# Patient Record
Sex: FEMALE | Race: WHITE | NOT HISPANIC OR LATINO | Employment: FULL TIME | URBAN - METROPOLITAN AREA
[De-identification: names, ages, dates, MRNs, and addresses within clinical notes are randomized per-mention and may not be internally consistent; named-entity substitution may affect disease eponyms.]

---

## 2021-08-30 ENCOUNTER — APPOINTMENT (OUTPATIENT)
Dept: CT IMAGING | Facility: CLINIC | Age: 56
End: 2021-08-30
Attending: EMERGENCY MEDICINE
Payer: COMMERCIAL

## 2021-08-30 ENCOUNTER — HOSPITAL ENCOUNTER (EMERGENCY)
Facility: CLINIC | Age: 56
Discharge: HOME | End: 2021-08-30
Attending: EMERGENCY MEDICINE | Admitting: EMERGENCY MEDICINE
Payer: COMMERCIAL

## 2021-08-30 VITALS
SYSTOLIC BLOOD PRESSURE: 122 MMHG | DIASTOLIC BLOOD PRESSURE: 80 MMHG | TEMPERATURE: 98.6 F | OXYGEN SATURATION: 95 % | RESPIRATION RATE: 10 BRPM | HEART RATE: 81 BPM

## 2021-08-30 DIAGNOSIS — J12.82 PNEUMONIA DUE TO 2019 NOVEL CORONAVIRUS: ICD-10-CM

## 2021-08-30 DIAGNOSIS — U07.1 PNEUMONIA DUE TO 2019 NOVEL CORONAVIRUS: ICD-10-CM

## 2021-08-30 LAB
ALBUMIN SERPL-MCNC: 4.1 G/DL (ref 3.4–5)
ALP SERPL-CCNC: 89 U/L (ref 40–150)
ALT SERPL W P-5'-P-CCNC: 50 U/L (ref 0–50)
ANION GAP SERPL CALCULATED.3IONS-SCNC: 7 MMOL/L (ref 3–14)
AST SERPL W P-5'-P-CCNC: 52 U/L (ref 0–45)
BASOPHILS # BLD AUTO: 0 10E3/UL (ref 0–0.2)
BASOPHILS NFR BLD AUTO: 1 %
BILIRUB SERPL-MCNC: 0.4 MG/DL (ref 0.2–1.3)
BUN SERPL-MCNC: 17 MG/DL (ref 7–30)
CALCIUM SERPL-MCNC: 8.8 MG/DL (ref 8.5–10.1)
CHLORIDE BLD-SCNC: 109 MMOL/L (ref 94–109)
CO2 SERPL-SCNC: 22 MMOL/L (ref 20–32)
CREAT SERPL-MCNC: 1.03 MG/DL (ref 0.52–1.04)
D DIMER PPP FEU-MCNC: 0.59 UG/ML FEU (ref 0–0.5)
EOSINOPHIL # BLD AUTO: 0 10E3/UL (ref 0–0.7)
EOSINOPHIL NFR BLD AUTO: 0 %
ERYTHROCYTE [DISTWIDTH] IN BLOOD BY AUTOMATED COUNT: 11.8 % (ref 10–15)
ETHANOL SERPL-MCNC: <0.01 G/DL
GFR SERPL CREATININE-BSD FRML MDRD: 62 ML/MIN/1.73M2
GLUCOSE BLD-MCNC: 93 MG/DL (ref 70–99)
HCT VFR BLD AUTO: 44.6 % (ref 35–47)
HGB BLD-MCNC: 15.2 G/DL (ref 11.7–15.7)
IMM GRANULOCYTES # BLD: 0 10E3/UL
IMM GRANULOCYTES NFR BLD: 0 %
LACTATE SERPL-SCNC: 1.5 MMOL/L (ref 0.7–2)
LYMPHOCYTES # BLD AUTO: 0.9 10E3/UL (ref 0.8–5.3)
LYMPHOCYTES NFR BLD AUTO: 23 %
MCH RBC QN AUTO: 29.9 PG (ref 26.5–33)
MCHC RBC AUTO-ENTMCNC: 34.1 G/DL (ref 31.5–36.5)
MCV RBC AUTO: 88 FL (ref 78–100)
MONOCYTES # BLD AUTO: 0.3 10E3/UL (ref 0–1.3)
MONOCYTES NFR BLD AUTO: 8 %
NEUTROPHILS # BLD AUTO: 2.5 10E3/UL (ref 1.6–8.3)
NEUTROPHILS NFR BLD AUTO: 68 %
NRBC # BLD AUTO: 0 10E3/UL
NRBC BLD AUTO-RTO: 0 /100
PLATELET # BLD AUTO: 173 10E3/UL (ref 150–450)
POTASSIUM BLD-SCNC: 3.5 MMOL/L (ref 3.4–5.3)
PROT SERPL-MCNC: 8.1 G/DL (ref 6.8–8.8)
RBC # BLD AUTO: 5.09 10E6/UL (ref 3.8–5.2)
SARS-COV-2 RNA RESP QL NAA+PROBE: POSITIVE
SODIUM SERPL-SCNC: 138 MMOL/L (ref 133–144)
TROPONIN I SERPL-MCNC: <0.015 UG/L (ref 0–0.04)
WBC # BLD AUTO: 3.7 10E3/UL (ref 4–11)

## 2021-08-30 PROCEDURE — 80053 COMPREHEN METABOLIC PANEL: CPT | Performed by: EMERGENCY MEDICINE

## 2021-08-30 PROCEDURE — 96375 TX/PRO/DX INJ NEW DRUG ADDON: CPT

## 2021-08-30 PROCEDURE — 36415 COLL VENOUS BLD VENIPUNCTURE: CPT | Performed by: EMERGENCY MEDICINE

## 2021-08-30 PROCEDURE — 85025 COMPLETE CBC W/AUTO DIFF WBC: CPT | Performed by: EMERGENCY MEDICINE

## 2021-08-30 PROCEDURE — 99285 EMERGENCY DEPT VISIT HI MDM: CPT | Mod: 25

## 2021-08-30 PROCEDURE — 250N000011 HC RX IP 250 OP 636: Performed by: EMERGENCY MEDICINE

## 2021-08-30 PROCEDURE — 93005 ELECTROCARDIOGRAM TRACING: CPT

## 2021-08-30 PROCEDURE — 250N000009 HC RX 250: Performed by: EMERGENCY MEDICINE

## 2021-08-30 PROCEDURE — 71275 CT ANGIOGRAPHY CHEST: CPT

## 2021-08-30 PROCEDURE — 83605 ASSAY OF LACTIC ACID: CPT | Performed by: EMERGENCY MEDICINE

## 2021-08-30 PROCEDURE — 85379 FIBRIN DEGRADATION QUANT: CPT | Performed by: EMERGENCY MEDICINE

## 2021-08-30 PROCEDURE — 82077 ASSAY SPEC XCP UR&BREATH IA: CPT | Performed by: EMERGENCY MEDICINE

## 2021-08-30 PROCEDURE — 87635 SARS-COV-2 COVID-19 AMP PRB: CPT | Performed by: EMERGENCY MEDICINE

## 2021-08-30 PROCEDURE — 96374 THER/PROPH/DIAG INJ IV PUSH: CPT | Mod: 59

## 2021-08-30 PROCEDURE — 84484 ASSAY OF TROPONIN QUANT: CPT | Performed by: EMERGENCY MEDICINE

## 2021-08-30 PROCEDURE — C9803 HOPD COVID-19 SPEC COLLECT: HCPCS

## 2021-08-30 RX ORDER — HYDROMORPHONE HYDROCHLORIDE 1 MG/ML
0.5 INJECTION, SOLUTION INTRAMUSCULAR; INTRAVENOUS; SUBCUTANEOUS ONCE
Status: COMPLETED | OUTPATIENT
Start: 2021-08-30 | End: 2021-08-30

## 2021-08-30 RX ORDER — IOPAMIDOL 755 MG/ML
500 INJECTION, SOLUTION INTRAVASCULAR ONCE
Status: COMPLETED | OUTPATIENT
Start: 2021-08-30 | End: 2021-08-30

## 2021-08-30 RX ORDER — ALBUTEROL SULFATE 90 UG/1
2 AEROSOL, METERED RESPIRATORY (INHALATION) EVERY 6 HOURS
Qty: 6.7 G | Refills: 0 | Status: SHIPPED | OUTPATIENT
Start: 2021-08-30

## 2021-08-30 RX ORDER — ONDANSETRON 4 MG/1
4 TABLET, ORALLY DISINTEGRATING ORAL EVERY 8 HOURS PRN
Qty: 10 TABLET | Refills: 0 | Status: SHIPPED | OUTPATIENT
Start: 2021-08-30 | End: 2021-09-02

## 2021-08-30 RX ORDER — ONDANSETRON 2 MG/ML
4 INJECTION INTRAMUSCULAR; INTRAVENOUS ONCE
Status: COMPLETED | OUTPATIENT
Start: 2021-08-30 | End: 2021-08-30

## 2021-08-30 RX ORDER — KETOROLAC TROMETHAMINE 15 MG/ML
15 INJECTION, SOLUTION INTRAMUSCULAR; INTRAVENOUS ONCE
Status: COMPLETED | OUTPATIENT
Start: 2021-08-30 | End: 2021-08-30

## 2021-08-30 RX ADMIN — ONDANSETRON 4 MG: 2 INJECTION INTRAMUSCULAR; INTRAVENOUS at 02:04

## 2021-08-30 RX ADMIN — IOPAMIDOL 60 ML: 755 INJECTION, SOLUTION INTRAVENOUS at 03:16

## 2021-08-30 RX ADMIN — KETOROLAC TROMETHAMINE 15 MG: 15 INJECTION, SOLUTION INTRAMUSCULAR; INTRAVENOUS at 02:05

## 2021-08-30 RX ADMIN — HYDROMORPHONE HYDROCHLORIDE 0.5 MG: 1 INJECTION, SOLUTION INTRAMUSCULAR; INTRAVENOUS; SUBCUTANEOUS at 02:04

## 2021-08-30 RX ADMIN — SODIUM CHLORIDE 80 ML: 9 INJECTION, SOLUTION INTRAVENOUS at 03:16

## 2021-08-30 ASSESSMENT — ENCOUNTER SYMPTOMS
ARTHRALGIAS: 1
DIARRHEA: 1
SHORTNESS OF BREATH: 1
VOMITING: 0
NAUSEA: 1
COUGH: 1
ABDOMINAL PAIN: 0
DYSURIA: 0
CHILLS: 1
HEADACHES: 1
MYALGIAS: 1
FEVER: 1

## 2021-08-30 NOTE — ED TRIAGE NOTES
Patient arrives from her hotel via EMS with complaints of cough and fever. Pt states she has not been feeling well since Thursday. Per EMS patient seemed anxious PTA and is febrile at 100.6. Patient has a hx of CRPS but has not been taking her medications which can cause these flair ups. Patient here from out of town visiting family. , VSS besides fever. GCS 15.

## 2021-08-30 NOTE — ED NOTES
"When RN was in room giving the patient medications, patient repeatedly stated \"I just want to die.\" \"Just let me die.\" \"Kill me now.\" \"I cant take this anymore.\". Patient denies wanting to physically harm herself at this time, but reports wanting mental health help. RN notified MD. Patient placed on PILY, RN notified patient of PILY.   "

## 2021-08-30 NOTE — ED PROVIDER NOTES
History   Chief Complaint:  Fever and Cough     The history is provided by the patient.      Azra Wills is a 56 year old female with history of CRPS who presents with fever and cough. Patient started feeling unwell four days ago. She states she is very achy. She is complaining of fever, chills, productive cough, nausea, headache, and diarrhea. She is short of breath and feels like she can't get a full breath in. She has taken nyquil and dayquil today. She has history of CRPS and hasn't been taking her pills. She is complaining of left foot pain. Usually takes oxycodone and gabapentin. Patient is from [place] and has been here for two weeks. Currently stays in a hotel. She denies chest pain, abdominal pain, or vomiting. No dysuria. No history of hypertension or diabetes.  She is not Covid vaccinated. No tobacco, drug, or alcohol use.     Review of Systems   Constitutional: Positive for chills and fever.   Respiratory: Positive for cough and shortness of breath.    Cardiovascular: Negative for chest pain.   Gastrointestinal: Positive for diarrhea and nausea. Negative for abdominal pain and vomiting.   Genitourinary: Negative for dysuria.   Musculoskeletal: Positive for arthralgias (left foot) and myalgias.   Neurological: Positive for headaches.   All other systems reviewed and are negative.    Allergies:  The patient has no known allergies.     Medications:  Oxycodone  Gabapentin    Past Medical History:    CRPS      Social History:  Presents to ED alone  From [place]  Denies ETOH/drug use    Physical Exam     Patient Vitals for the past 24 hrs:   BP Temp Temp src Pulse Resp SpO2   08/30/21 0445 122/80 -- -- 81 -- 95 %   08/30/21 0430 133/79 -- -- -- -- --   08/30/21 0235 -- -- -- 83 10 94 %   08/30/21 0230 132/83 -- -- 80 12 91 %   08/30/21 0225 -- -- -- 75 13 92 %   08/30/21 0220 -- -- -- 77 15 93 %   08/30/21 0215 123/77 -- -- 81 17 98 %   08/30/21 0200 (!) 130/97 -- -- 87 -- 98 %   08/30/21 0145  133/84 -- -- 85 20 99 %   08/30/21 0130 137/82 -- -- 89 -- 95 %   08/30/21 0115 (!) 136/91 -- -- 96 13 100 %   08/30/21 0100 (!) 140/93 -- -- 93 18 98 %   08/30/21 0053 (!) 140/93 98.6  F (37  C) Oral 98 20 97 %       Physical Exam  Nursing note and vitals reviewed.  Constitutional: Well nourished.   Eyes: Conjunctiva normal.  Pupils are equal, round, and reactive to light.   ENT: Nose normal. Mucous membranes pink and moist.    Neck: Normal range of motion.  CVS: Normal rate, regular rhythm.  Normal heart sounds.  No murmur.  Pulmonary: Lungs clear to auscultation bilaterally. No wheezes/rales/rhonchi.  GI: Abdomen soft. Nontender, nondistended. No rigidity or guarding.    MSK: No calf tenderness or swelling.  No reproducible bilateral foot tenderness, full active ROM all toes and bilateral ankles. No overlying warmth/erythema.  Neuro: Alert. Follows simple commands.  Skin: Skin is warm and dry. No rash noted.   Psychiatric: Quite anxious appearing, tearful.        Emergency Department Course   ECG  ECG taken at 0151, ECG read at 0151  Normal sinus rhythm  Nonspecific ST and T wave abnormality  Abnormal ECG   Rate 97 bpm. OH interval 138 ms. QRS duration 82 ms. QT/QTc 352/447 ms. P-R-T axes 55 65 53.     Imaging:  CT Chest Pulmonary Embolism w Contrast  1. Several small irregular-shaped nodular groundglass opacities scattered within both lungs, likely infectious or inflammatory in etiology. This appearance is typical of COVID-19 pneumonia.   2. No visualized pulmonary embolus.   As per radiology.     Laboratory:  Symptomatic COVID-19 Virus (Coronavirus) by PCR Nasopharyngeal: Positive(A)  CMP: AST 52(H), o/w WNL (Creatinine 1.03)   Lactic acid (result time 0220): 1.5   Ddimer: 0.59(H)  Troponin (Collected 0210): <0.015  CBC: WBC 3.7(L), HGB 15.2,    Alcohol level blood: <0.01    Emergency Department Course:    Reviewed:  I reviewed nursing notes, vitals, past medical history and care everywhere   [place]   "reviewed. On 8/20 patient had 50 percocet filled.    Assessments:  0137 I obtained history and examined the patient as noted above.   0219 Patient reportedly telling RN she feels so poorly she wants to die.  Patient placed on PILY and agreeable to DEC  0423 I rechecked the patient and explained findings. She reports feeling some improvement. I discussed recommendation for DEC if patient truly having thoughts of self harm but she states she just \"felt so poor physically\"    Interventions:  0204 Zofran, 4 mg, IV  0204 Dilaudid, 0.5 mg, IV  0205 Toradol, 15 mg, IV    Disposition:  The patient was discharged to home.       Impression & Plan   HEART Score:    Predicts Major Adverse Cardiac Events within 6 weeks:    History:   Highly suspicious:  2   Moderately suspicious: 1   Slightly/Non-suspicious: 0  ECG:   Significant ST depression 2   Nonspecific repolarization 1   Normal    0  Age:    >=65    2   >45-<65   1   <= 45    0  Risk Factors [DM, Current or recent smoker, HTN, HLP, FMH CAD, Obesity]   >=3 or Hx. CAD  2   1-2    1   None    0  Troponin:   >= 3x normal   2   >1 to <3x normal  1   Normal    0    This Patient's Score:   1    Interpretation:    0-3:    2.5% risk of MACE (may consider D/C)   4-6:    20.3% (Observation)   7-10:    72.7% (Admit, consider early invasive strategy)        Medical Decision Making:  Patient is a 56 year old female presenting with a myriad of complaints predominately fever, cough, and chest pain. She also reports foot pain though reports this is chronic secondary to CRPS.  She underwent an extensive workup during her time in the ED. She tested positive for COVID-19. EKG with nonspecific ST changes though no STEMI. Delta troponin negative. Clinically doubt ACS, low HEART score. Screening D-dimer elevated so she did undergo formal CT which is fortunately without evidence of PE though does suggest Covid-like pneumonia. Labs reviewed, no evidence to suggest severe sepsis. She does have a " "nonspecific mild transaminates. No reproducible abdominal pain on exam. She ambulated without hypoxia or significant work of breathing. During patient's time in the ED she did reportedly making states suggesting wanting to die as she felt so poorly.  She was placed on an PILY and recommended DEC evaluation.  A few hours later,patient reported some relief in symptoms after analgesia.  I clarified extensively with patient her reported \"wanting to die\" and she stated she physically just felt so poor she felt like wanting to die though would never harm herself.  She is not psychotic and I do not feel at this point in time she contracts for safety.  I did offer patient outpatient mental health resources as needed though she stated those were not necessary.  \"I just want to feel better.\"      Patient feels comfortable going home at this point in time. I counseled her on need for quarantine with COVID-19 diagnosis. She states she is supposed to take a plane back to [place] today and I counseled her extensively not to do this and to follow quarantine guidelines. She will be discharged home with [facility] referral loop. Odt Zofran as well as albuterol on dispo. No indication for hospitalization or steroids at this point in time. She was cautioned to return for increased work of breathing, lethargy, or should symptoms worsen or change. I did also consult patient to return to ED should she have persistent SpO2 <90%.  She requested analgesia on dispo though review of [place]  shows a large quantity of percocet refill a few days ago.  No indication for additional narcotic prescriptions at this time.      Covid-19  Azra Wills was evaluated during a global COVID-19 pandemic, which necessitated consideration that the patient might be at risk for infection with the SARS-CoV-2 virus that causes COVID-19.   Applicable protocols for evaluation were followed during the patient's care.   COVID-19 was considered as part of " the patient's evaluation. The plan for testing is:  a test was obtained during this visit.    Diagnosis:    ICD-10-CM    1. Pneumonia due to 2019 novel coronavirus  U07.1 COVID-19 [facility] Referral    J12.82        Discharge Medications:  Discharge Medication List as of 8/30/2021  4:43 AM      START taking these medications    Details   albuterol (PROAIR HFA/PROVENTIL HFA/VENTOLIN HFA) 108 (90 Base) MCG/ACT inhaler Inhale 2 puffs into the lungs every 6 hours, Disp-6.7 g, R-0, [facility] may dispense brand covered by insurance (Proair, or proventil or ventolin or generic albuterol inhaler)      ondansetron (ZOFRAN ODT) 4 MG ODT tab Take 1 tablet (4 mg) by mouth every 8 hours as needed for nausea, Disp-10 tablet, R-0, Local [facility]             Scribe Disclosure:  Bertram CHAND, am serving as a scribe at 1:33 AM on 8/30/2021 to document services personally performed by Zandra Grimm DO based on my observations and the provider's statements to me.            Zandra Grimm DO  08/30/21 3502

## 2021-09-01 ENCOUNTER — TELEPHONE (OUTPATIENT)
Dept: INTERNAL MEDICINE | Facility: CLINIC | Age: 56
End: 2021-09-01

## 2021-09-01 NOTE — TELEPHONE ENCOUNTER
calling from [place] for patient who is in [place] visiting family.  Pt was seen in the ER and diagnoses with COVID.  He is worried that the inhaler will run out and would like a refill of this.  Informed him that she will need an appt in order for a provider in the clinic to refill otherwise he can try to reach out to her primary clinic to see what they can do.  Advised that he have pt look at the counter on the inhaler to see how many puffs are left.  If she is not getting better or worse then she needs to be seen     will relay advise to patient    Osmar Christine RN, BSN

## 2021-09-02 ENCOUNTER — NURSE TRIAGE (OUTPATIENT)
Dept: NURSING | Facility: CLINIC | Age: 56
End: 2021-09-02

## 2021-09-02 NOTE — TELEPHONE ENCOUNTER
Sister calling about symptoms for Azra who she says has covid and pneumonia.      No consent and not with patient.  Asking for oxygen for sister.  Needs to call back when with her to triage her.    Not possible she says as she is with her dying mother now.  She needs to call her covid positive sick sister and tell her to go to the ER.    Carlene Gonzales RN  [facility] Nurse Advisor